# Patient Record
Sex: FEMALE | Race: WHITE | NOT HISPANIC OR LATINO | Employment: PART TIME | URBAN - METROPOLITAN AREA
[De-identification: names, ages, dates, MRNs, and addresses within clinical notes are randomized per-mention and may not be internally consistent; named-entity substitution may affect disease eponyms.]

---

## 2017-02-01 ENCOUNTER — OFFICE VISIT (OUTPATIENT)
Dept: OBSTETRICS AND GYNECOLOGY | Facility: CLINIC | Age: 32
End: 2017-02-01

## 2017-02-01 VITALS
HEIGHT: 63 IN | RESPIRATION RATE: 14 BRPM | DIASTOLIC BLOOD PRESSURE: 72 MMHG | SYSTOLIC BLOOD PRESSURE: 112 MMHG | WEIGHT: 142 LBS | BODY MASS INDEX: 25.16 KG/M2

## 2017-02-01 DIAGNOSIS — Z01.419 WELL WOMAN EXAM WITH ROUTINE GYNECOLOGICAL EXAM: Primary | ICD-10-CM

## 2017-02-01 PROBLEM — K21.9 GASTROESOPHAGEAL REFLUX DISEASE WITHOUT ESOPHAGITIS: Status: ACTIVE | Noted: 2017-02-01

## 2017-02-01 PROCEDURE — 99395 PREV VISIT EST AGE 18-39: CPT | Performed by: OBSTETRICS & GYNECOLOGY

## 2017-02-01 NOTE — PROGRESS NOTES
Subjective   Chief Complaint   Patient presents with   • Gynecologic Exam     Park Ferrara is a 32 y.o. year old  presenting to be seen for her annual exam.     SEXUAL Hx:  She is currently sexually active.  In the past year there has not been new sexual partners.    Condoms are not typically used.  She would not like to be screened for STD's at today's exam.  Current birth control method: tubal ligation.  She is happy with her current method of contraception and does not want to discuss alternative methods of contraception.  MENSTRUAL Hx:  Patient's last menstrual period was 2017 (exact date).  In the past 6 months her cycles have been unpredictable.   Her menstrual flow is normal.   Each month on average there are roughly 3 days of very heavy flow.    Intermenstrual bleeding is present.    Post-coital bleeding is present.  Dysmenorrhea: none and is not affecting her activities of daily living  PMS: moderate and is not affecting her activities of daily living  Her cycles are a source of concern for her that she wishes to discuss today.  She is on the POP pills but is interested in IUS for cycles.  HEALTH Hx:  She exercises regularly: yes.  She wears her seat belt:yes.  She has concerns about domestic violence: no.  OTHER COMPLAINTS:  none    The following portions of the patient's history were reviewed and updated as appropriate:problem list, current medications, allergies, past family history, past medical history, past social history and past surgical history.    Smoking status: Never Smoker                                                                 Smokeless status: Not on file                       Review of Systems  Consitutional POS: nothing reported    NEG: anorexia or night sweats   Gastointestinal POS: constipation    NEG: bloating, change in bowel habits, melena or reflux symptoms   Genitourinary POS: nothing reported    NEG: dysuria or hematuria   Integument POS: nothing  "reported    NEG: moles that are changing in size, shape, color or rashes   Breast POS: nothing reported    NEG: persistent breast lump, skin dimpling or nipple discharge        Objective   Visit Vitals   • /72   • Resp 14   • Ht 63\" (160 cm)   • Wt 142 lb (64.4 kg)   • LMP 01/11/2017 (Exact Date)   • Breastfeeding Yes   • BMI 25.15 kg/m2       General:  well developed; well nourished  no acute distress   Skin:  No suspicious lesions seen   Thyroid: normal to inspection and palpation   Breasts:  Examined in supine position  Symmetric without masses or skin dimpling  Nipples normal without inversion, lesions or discharge  There are no palpable axillary nodes   Abdomen: soft, non-tender; no masses  no umbilical or inginual hernias are present  no hepato-splenomegaly   Pelvis: Clinical staff was present for exam  External genitalia:  normal appearance of the external genitalia including Barth[place]'s and [place]'s glands.  :  urethral meatus normal; urethral hypermobility is absent.  Vaginal:  normal pink mucosa without prolapse or lesions.  Cervix:  normal appearance.  Uterus:  normal size, shape and consistency. anteverted;  Adnexa:  normal bimanual exam of the adnexa.  Rectal:  digital rectal exam not performed; anus visually normal appearing.        Assessment   1. Normal GYN exam  2. Menorrhagia - negative W/U.  Most consistent with C/S scar niche     Plan   1. Pap was done today.  If she does not receive the results of the Pap within 2 weeks  time, she was instructed to call to find out the results.  I explained to Park that the recommendations for Pap smear interval in a low risk patient's has lengthened to 3 years time.  I encouraged her to be seen yearly for a full physical exam including breast and pelvic exam even during the off years when PAP's will not be performed.  2. Follow up for annual exam and for IUD placement if PAP is normal          This note was electronically signed.    Jhon Lees, " M.D.  February 1, 2017

## 2017-02-27 ENCOUNTER — OFFICE VISIT (OUTPATIENT)
Dept: OBSTETRICS AND GYNECOLOGY | Facility: CLINIC | Age: 32
End: 2017-02-27

## 2017-02-27 VITALS
DIASTOLIC BLOOD PRESSURE: 72 MMHG | HEIGHT: 63 IN | WEIGHT: 140 LBS | RESPIRATION RATE: 14 BRPM | SYSTOLIC BLOOD PRESSURE: 116 MMHG | BODY MASS INDEX: 24.8 KG/M2

## 2017-02-27 DIAGNOSIS — N92.0 MENORRHAGIA WITH REGULAR CYCLE: ICD-10-CM

## 2017-02-27 DIAGNOSIS — Z97.5 CONTRACEPTION, DEVICE INTRAUTERINE: Primary | ICD-10-CM

## 2017-02-27 PROCEDURE — 58300 INSERT INTRAUTERINE DEVICE: CPT | Performed by: OBSTETRICS & GYNECOLOGY

## 2017-02-27 NOTE — PATIENT INSTRUCTIONS
Intrauterine Device Insertion   Most often, an intrauterine device (IUD) is inserted into the uterus to prevent pregnancy. There are 2 types of IUDs available:  · Copper IUD--This type of IUD creates an environment that is not favorable to sperm survival. The mechanism of action of the copper IUD is not known for certain. It can stay in place for 10 years.  · Hormone IUD--This type of IUD contains the hormone progestin (synthetic progesterone). The progestin thickens the cervical mucus and prevents sperm from entering the uterus, and it also thins the uterine lining. There is no evidence that the hormone IUD prevents implantation. One hormone IUD can stay in place for up to 5 years, and a different hormone IUD can stay in place for up to 3 years.  An IUD is the most cost-effective birth control if left in place for the full duration. It may be removed at any time.  LET YOUR HEALTH CARE PROVIDER KNOW ABOUT:  · Any allergies you have.  · All medicines you are taking, including vitamins, herbs, eye drops, creams, and over-the-counter medicines.  · Previous problems you or members of your family have had with the use of anesthetics.  · Any blood disorders you have.  · Previous surgeries you have had.  · Possibility of pregnancy.  · Medical conditions you have.  RISKS AND COMPLICATIONS   Generally, intrauterine device insertion is a safe procedure. However, as with any procedure, complications can occur. Possible complications include:  · Accidental puncture (perforation) of the uterus.  · Accidental placement of the IUD either in the muscle layer of the uterus (myometrium) or outside the uterus. If this happens, the IUD can be found essentially floating around the bowels and must be taken out surgically.  · The IUD may fall out of the uterus (expulsion). This is more common in women who have recently had a child.    · Pregnancy in the fallopian tube (ectopic).  · Pelvic inflammatory disease (PID), which is infection of  the uterus and fallopian tubes. The risk of PID is slightly increased in the first 20 days after the IUD is placed, but the overall risk is still very low.  BEFORE THE PROCEDURE  · Schedule the IUD insertion for when you will have your menstrual period or right after, to make sure you are not pregnant. Placement of the IUD is better tolerated shortly after a menstrual cycle.  · You may need to take tests or be examined to make sure you are not pregnant.  ¨ You may be required to take a pregnancy test.  ¨ You may be required to get checked for sexually transmitted infections (STIs) prior to placement. Placing an IUD in someone who has an infection can make the infection worse.  · You may be given a pain reliever to take 1 or 2 hours before the procedure.  · An exam will be performed to determine the size and position of your uterus.  · Ask your health care provider about changing or stopping your regular medicines.  PROCEDURE   · A tool (speculum) is placed in the vagina. This allows your health care provider to see the lower part of the uterus (cervix).  · The cervix is prepped with a medicine that lowers the risk of infection.  · You may be given a medicine to numb each side of the cervix (intracervical or paracervical block). This is used to block and control any discomfort with insertion.  · A tool (uterine sound) is inserted into the uterus to determine the length of the uterine cavity and the direction the uterus may be tilted.  · A slim instrument (IUD ) is inserted through the cervical canal and into your uterus.  · The IUD is placed in the uterine cavity and the insertion device is removed.  · The nylon string that is attached to the IUD and used for eventual IUD removal is trimmed. It is trimmed so that it lays high in the vagina, just outside the cervix.  AFTER THE PROCEDURE  · You may have bleeding after the procedure. This is normal. It varies from light spotting for a few days to menstrual-like  bleeding.   · You may have mild cramping.     This information is not intended to replace advice given to you by your health care provider. Make sure you discuss any questions you have with your health care provider.     Document Released: 08/15/2012 Document Revised: 10/08/2014 Document Reviewed: 06/08/2014  Elsevier Interactive Patient Education ©2016 [employer]

## 2017-02-27 NOTE — PROGRESS NOTES
IUD Insertion    Patient's last menstrual period was 02/14/2017 (approximate).    Date of procedure:  2/27/2017    Risks and benefits discussed? yes  All questions answered? yes  Consents given by The patient  Written consent obtained? yes    Local anesthesia used:  no    Procedure documentation:    After verifying the patient had a low probability of being pregnant and met the criteria for insertion, a sterile speculum has placed and the cervix was cleansed with an antiseptic solution.  Vaginal discharge was scant.  The anterior lip of the cervix was grasped with a tenaculum and the uterine cavity was gently sounded. There was no difficulty passing the sound through the cervix.  Cervical dilation did not need to be performed prior to placing the IUD.  The uterus was anteverted and sounded to 9 cms.  The Mirena was then prepared per the manufacturers instructions.    The Mirena was advanced to a point 2 cms from the fundus and then the arms were released from the sheath.  The device was advanced to the fundus and the device was released fully from the sheath.. The string was cut 3 cms in length.  Bleeding from the cervix was scant.    She tolerated the procedure without any difficulty.     Post procedure instructions: It was reviewed that the Mirena will not alter the timing of when she bleeds but it may decrease the quantity of flow and cramps.  Roughly 30% of people will be amenorrheic over time.  Efficacy rate of 99.2% over 5 years was discussed.  Spontaneous expulsion rate of 1-2% was also discussed.  If she has any issue with fever or excessive bleeding or pain she is to call the office immediately.  Otherwise I would like to see her back in 6 weeks with an ultrasound to confirm correct placement.    This note was electronically signed.    Jhon Lees M.D.  February 27, 2017

## 2017-04-13 ENCOUNTER — OFFICE VISIT (OUTPATIENT)
Dept: OBSTETRICS AND GYNECOLOGY | Facility: CLINIC | Age: 32
End: 2017-04-13

## 2017-04-13 VITALS — RESPIRATION RATE: 14 BRPM | BODY MASS INDEX: 24.27 KG/M2 | WEIGHT: 137 LBS

## 2017-04-13 DIAGNOSIS — N92.6 IRREGULAR MENSES: ICD-10-CM

## 2017-04-13 DIAGNOSIS — Z97.5 CONTRACEPTION, DEVICE INTRAUTERINE: Primary | ICD-10-CM

## 2017-04-13 PROCEDURE — 99213 OFFICE O/P EST LOW 20 MIN: CPT | Performed by: OBSTETRICS & GYNECOLOGY

## 2017-04-13 NOTE — PROGRESS NOTES
Subjective   Chief Complaint   Patient presents with   • Imaging Only     iud placement     Park Ferrara is a 32 y.o. year old  presenting to be seen for follow-up of her recently placed Mirena.  Since the time of insertion she is spotting daily.  Also there are bladder spasm-like symptoms when she goes running.  Of concern is that she is training to do a half marathon.  It's going to be done in less than a month.  She's worried about the impact of the IUD and the bleeding as relates to her training schedule.  She has been sexually active.  [place] has not been painful for her.   [place] has not been painful for her partner.    OTHER THINGS SHE WANTS TO DISCUSS TODAY:  Nothing else       Objective   Resp 14  Wt 137 lb (62.1 kg)  LMP 2017 (Approximate)  Breastfeeding? No  BMI 24.27 kg/m2    Imaging   Pelvic ultrasound report  Pelvic ultrasound images independantly reviewed - There is a IUD appropriately placed and opened and the endometrial cavity.  There is a simple benign-appearing left ovarian cyst.  Cul-de-sac fluid is absent       Assessment   1. Normally placed endometrial IUD  2. Spotting since placement with associated cramps     Plan   1. Explained to Park that at this point I think to her know grade options to guarantee the absence of cramps or bleeding.  I do expect that with time her bleeding should diminish or potentially resolve fully.  Furthermore I think the cramps should get better with time.  I cannot exclude the cramps being unrelated to the IUD.  2. Offered short course of low-dose birth control pill.  At this point I think she would rather wait and see what happens without prescription therapy.  3. Trial of NSAIDs empirically may be of benefit  4. Follow up PRN        Total time spent today with Park  was 20 minutes (level 3).  Greater than 50% of the time was spent coordinating care, answering her questions and counseling regarding pathophysiology of her  presenting problem along with plans for any diagnositc work-up and treatment.    This note was electronically signed.    Jhon Lees M.D.  April 13, 2017

## 2020-09-17 ENCOUNTER — OFFICE VISIT (OUTPATIENT)
Dept: OBSTETRICS AND GYNECOLOGY | Facility: CLINIC | Age: 35
End: 2020-09-17

## 2020-09-17 ENCOUNTER — LAB (OUTPATIENT)
Dept: LAB | Facility: HOSPITAL | Age: 35
End: 2020-09-17

## 2020-09-17 VITALS — RESPIRATION RATE: 14 BRPM | WEIGHT: 133 LBS | BODY MASS INDEX: 23.56 KG/M2

## 2020-09-17 DIAGNOSIS — N92.6 IRREGULAR MENSES: ICD-10-CM

## 2020-09-17 DIAGNOSIS — E03.9 HYPOTHYROIDISM (ACQUIRED): ICD-10-CM

## 2020-09-17 DIAGNOSIS — Z30.431 CHECKING OF INTRAUTERINE DEVICE: Primary | ICD-10-CM

## 2020-09-17 DIAGNOSIS — N92.6 IRREGULAR MENSES: Primary | ICD-10-CM

## 2020-09-17 LAB
HCG INTACT+B SERPL-ACNC: <0.5 MIU/ML
TSH SERPL DL<=0.05 MIU/L-ACNC: 0.17 UIU/ML (ref 0.27–4.2)

## 2020-09-17 PROCEDURE — 84443 ASSAY THYROID STIM HORMONE: CPT | Performed by: OBSTETRICS & GYNECOLOGY

## 2020-09-17 PROCEDURE — 36415 COLL VENOUS BLD VENIPUNCTURE: CPT | Performed by: OBSTETRICS & GYNECOLOGY

## 2020-09-17 PROCEDURE — 99203 OFFICE O/P NEW LOW 30 MIN: CPT | Performed by: OBSTETRICS & GYNECOLOGY

## 2020-09-17 PROCEDURE — 84702 CHORIONIC GONADOTROPIN TEST: CPT | Performed by: OBSTETRICS & GYNECOLOGY

## 2020-09-17 RX ORDER — LEVOTHYROXINE SODIUM 0.1 MG/1
100 TABLET ORAL DAILY
Qty: 30 TABLET | Refills: 5 | Status: SHIPPED | OUTPATIENT
Start: 2020-09-17

## 2020-09-17 RX ORDER — LEVOTHYROXINE SODIUM 0.1 MG/1
100 TABLET ORAL DAILY
Start: 2020-09-17 | End: 2020-09-17 | Stop reason: SDUPTHER

## 2020-09-17 NOTE — PROGRESS NOTES
Subjective   Chief Complaint   Patient presents with   • Menstrual Problem     bleeding with iud       Park Ferrara is a 35 y.o. year old .  No LMP recorded.  She presents because of irregular menses.  She is a patient of mine but it is been over 3 years since she was last seen.  She has an IUD.  She had an ultrasound done after IUD placement showing normal location.  She has no history of abnormal cervical cytology.  Her last Pap smear was in 2017.  As relates to the irregular bleeding there is been no work-up done thus far to date.    Her cycles are changed over the last year.  It started off they are occurring every couple of months and then they spaced out even further.  Over the last 2 months her cycles have been quite heavy and occurring every 2 weeks.    Last TSH was done a couple of months ago.  They were reportedly normal.  Those records not available for review    Has had a 20 pound intentional weight loss over the last year.  Stress levels have really not been a big deal.    The following portions of the patient's history were reviewed and updated as appropriate:current medications, allergies, past family history, past medical history, past social history and past surgical history    Social History    Tobacco Use      Smoking status: Never Smoker      Smokeless tobacco: Never Used         Objective   Resp 14   Wt 60.3 kg (133 lb)   Breastfeeding No   BMI 23.56 kg/m²     Lab Review   No data reviewed    Imaging   Pelvic ultrasound images independantly reviewed - Ultrasound from today shows IUD appropriately positioned in the anteverted uterus.  There is no adnexal mass or cul-de-sac fluid        Assessment   1. Irregular menses with initial oligomenorrhea followed by polymenorrhea.  Normal IUD placement.  Changes are most consistent with anovulation.  Cannot exclude influence of thyroid function on bleeding patterns     Plan   1. The following tests were ordered today: HCG and TSH.  It  was explained to Park that all lab test should be back within the one week after they are performed. She will be notified about the results, regardless of the findings. If she has not been contacted by the office within 2 weeks after the test has been performed, it is her responsibility to contact us to learn about her results.  2. The importance of keeping all planned follow-up and taking all medications as prescribed was emphasized.  3. If lab work is normal would consider short course of oral contraceptives and see if that straightens out her cycle.  4. Follow up for annual exam and Pap smear         This note was electronically signed.    Jhon Lees M.D.  September 17, 2020    Total time spent today with Park  was 35 minutes (level 3).  Off this time, 80% was spent face-to-face time coordinating care, answering her questions and counseling regarding pathophysiology of her presenting problem along with plans for any diagnositc work-up and treatment.    Note: Speech recognition transcription software may have been used to create portions of this document.  An attempt at proofreading has been made but errors in transcription could still be present.

## 2020-09-18 ENCOUNTER — TELEPHONE (OUTPATIENT)
Dept: OBSTETRICS AND GYNECOLOGY | Facility: CLINIC | Age: 35
End: 2020-09-18

## 2020-09-18 NOTE — TELEPHONE ENCOUNTER
----- Message from Jhon Lees MD sent at 9/17/2020 10:30 PM EDT -----  Please call Park regarding her test results.  She is not pregnant.  It appears her dose of thyroid medication is to high.  This could be the cause of her bleeding.  I have sent a lower dose of her medication to the pharmacy.  She needs to have her TSH rechecked in 6 weeks.  That order has also been placed.    In the interim, if she want to start a BCP that might help.  If she wants to see if the lower dose of thyroid medication helps, that is also reasonable.

## 2020-12-23 ENCOUNTER — IMMUNIZATION (OUTPATIENT)
Dept: VACCINE CLINIC | Facility: HOSPITAL | Age: 35
End: 2020-12-23

## 2020-12-23 PROCEDURE — 91300 HC SARSCOV02 VAC 30MCG/0.3ML IM: CPT

## 2020-12-23 PROCEDURE — 0001A: CPT

## 2021-01-13 ENCOUNTER — IMMUNIZATION (OUTPATIENT)
Dept: VACCINE CLINIC | Facility: HOSPITAL | Age: 36
End: 2021-01-13

## 2021-01-13 PROCEDURE — 0001A: CPT

## 2021-01-13 PROCEDURE — 0002A: CPT

## 2021-01-13 PROCEDURE — 91300 HC SARSCOV02 VAC 30MCG/0.3ML IM: CPT
